# Patient Record
Sex: FEMALE | Race: WHITE | NOT HISPANIC OR LATINO | Employment: OTHER | URBAN - METROPOLITAN AREA
[De-identification: names, ages, dates, MRNs, and addresses within clinical notes are randomized per-mention and may not be internally consistent; named-entity substitution may affect disease eponyms.]

---

## 2018-03-22 ENCOUNTER — NEW PATIENT COMPREHENSIVE (OUTPATIENT)
Dept: URBAN - METROPOLITAN AREA CLINIC 46 | Facility: CLINIC | Age: 73
End: 2018-03-22

## 2018-03-22 DIAGNOSIS — H25.813: ICD-10-CM

## 2018-03-22 DIAGNOSIS — H04.123: ICD-10-CM

## 2018-03-22 DIAGNOSIS — H53.8: ICD-10-CM

## 2018-03-22 PROCEDURE — G8482 FLU IMMUNIZE ORDER/ADMIN: HCPCS

## 2018-03-22 PROCEDURE — 92015 DETERMINE REFRACTIVE STATE: CPT

## 2018-03-22 PROCEDURE — 4040F PNEUMOC VAC/ADMIN/RCVD: CPT

## 2018-03-22 PROCEDURE — 1036F TOBACCO NON-USER: CPT

## 2018-03-22 PROCEDURE — 92004 COMPRE OPH EXAM NEW PT 1/>: CPT

## 2018-03-22 PROCEDURE — G8785 BP SCRN NO PERF AT INTERVAL: HCPCS

## 2018-03-22 PROCEDURE — G8427 DOCREV CUR MEDS BY ELIG CLIN: HCPCS

## 2018-03-22 ASSESSMENT — VISUAL ACUITY
OS_CC: 20/25-1
OS_SC: 20/50
OD_CC: 20/25-1
OS_CC: J1
OS_SC: J6
OD_CC: J1
OD_SC: 20/40
OD_SC: J12

## 2018-03-22 ASSESSMENT — TONOMETRY
OS_IOP_MMHG: 17
OD_IOP_MMHG: 16

## 2018-11-12 ENCOUNTER — ESTABLISHED PATIENT (OUTPATIENT)
Dept: URBAN - METROPOLITAN AREA CLINIC 46 | Facility: CLINIC | Age: 73
End: 2018-11-12

## 2018-11-12 DIAGNOSIS — H35.371: ICD-10-CM

## 2018-11-12 DIAGNOSIS — H25.813: ICD-10-CM

## 2018-11-12 DIAGNOSIS — H04.123: ICD-10-CM

## 2018-11-12 DIAGNOSIS — H43.813: ICD-10-CM

## 2018-11-12 PROCEDURE — 99214 OFFICE O/P EST MOD 30 MIN: CPT

## 2018-11-12 ASSESSMENT — TONOMETRY
OD_IOP_MMHG: 15
OS_IOP_MMHG: 16

## 2018-11-12 ASSESSMENT — VISUAL ACUITY
OD_CC: J5
OD_CC: 20/50
OS_CC: 20/40+2
OS_CC: J5

## 2018-11-27 ENCOUNTER — CATARACT CONSULT (OUTPATIENT)
Dept: URBAN - METROPOLITAN AREA CLINIC 43 | Facility: CLINIC | Age: 73
End: 2018-11-27

## 2018-11-27 VITALS
SYSTOLIC BLOOD PRESSURE: 126 MMHG | RESPIRATION RATE: 14 BRPM | DIASTOLIC BLOOD PRESSURE: 58 MMHG | HEIGHT: 55 IN | HEART RATE: 56 BPM

## 2018-11-27 DIAGNOSIS — H43.813: ICD-10-CM

## 2018-11-27 DIAGNOSIS — H35.371: ICD-10-CM

## 2018-11-27 DIAGNOSIS — H04.123: ICD-10-CM

## 2018-11-27 DIAGNOSIS — H25.811: ICD-10-CM

## 2018-11-27 DIAGNOSIS — H18.51: ICD-10-CM

## 2018-11-27 DIAGNOSIS — H25.812: ICD-10-CM

## 2018-11-27 PROCEDURE — 92025-2 CORNEAL TOPOGRAPHY, PT

## 2018-11-27 PROCEDURE — 92286 ANT SGM IMG I&R SPECLR MIC: CPT

## 2018-11-27 PROCEDURE — G9903 PT SCRN TBCO ID AS NON USER: HCPCS

## 2018-11-27 PROCEDURE — 92136TC INTERFEROMETRY - TECHNICAL COMPONENT

## 2018-11-27 PROCEDURE — G8482 FLU IMMUNIZE ORDER/ADMIN: HCPCS

## 2018-11-27 PROCEDURE — 92134 CPTRZ OPH DX IMG PST SGM RTA: CPT

## 2018-11-27 PROCEDURE — G8952 PRE-HTN/HTN, NO F/U, NOT GVN: HCPCS

## 2018-11-27 PROCEDURE — 1036F TOBACCO NON-USER: CPT

## 2018-11-27 PROCEDURE — 99214 OFFICE O/P EST MOD 30 MIN: CPT

## 2018-11-27 PROCEDURE — V2799I IMPRIMIS

## 2018-11-27 PROCEDURE — 4040F PNEUMOC VAC/ADMIN/RCVD: CPT

## 2018-11-27 PROCEDURE — G8428 CUR MEDS NOT DOCUMENT: HCPCS

## 2018-11-27 RX ORDER — NEPAFENAC 3 MG/ML: 1 SUSPENSION/ DROPS OPHTHALMIC ONCE A DAY

## 2018-11-27 ASSESSMENT — TONOMETRY
OD_IOP_MMHG: 14
OS_IOP_MMHG: 14

## 2018-11-27 ASSESSMENT — VISUAL ACUITY
OS_BAT: 20/60
OD_PAM: 20/20-1
OS_SC: J6
OD_BAT: 20/80
OS_SC: 20/50-1
OD_SC: 20/60-1
OS_CC: 20/25
OD_SC: J12
OD_CC: J1
OS_CC: J2
OS_AM: 20/20-1
OD_CC: 20/40-1

## 2019-01-09 ENCOUNTER — PRE-OP/H&P (OUTPATIENT)
Dept: URBAN - METROPOLITAN AREA CLINIC 39 | Facility: CLINIC | Age: 74
End: 2019-01-09

## 2019-01-09 ENCOUNTER — SURGERY/PROCEDURE (OUTPATIENT)
Dept: URBAN - METROPOLITAN AREA CLINIC 43 | Facility: CLINIC | Age: 74
End: 2019-01-09

## 2019-01-09 VITALS
RESPIRATION RATE: 14 BRPM | HEART RATE: 58 BPM | SYSTOLIC BLOOD PRESSURE: 126 MMHG | DIASTOLIC BLOOD PRESSURE: 56 MMHG | HEIGHT: 55 IN

## 2019-01-09 DIAGNOSIS — H25.812: ICD-10-CM

## 2019-01-09 PROCEDURE — 66999LNSR LENSAR LASER FOR CAT SX

## 2019-01-09 PROCEDURE — 4040F PNEUMOC VAC/ADMIN/RCVD: CPT

## 2019-01-09 PROCEDURE — G8952 PRE-HTN/HTN, NO F/U, NOT GVN: HCPCS

## 2019-01-09 PROCEDURE — 99211T TECH SERVICE

## 2019-01-09 PROCEDURE — 66984AV REMOVE CATARACT, INSERT ADVANCED LENS

## 2019-01-09 PROCEDURE — 65772LRI LRI DURING CAT SX

## 2019-01-09 PROCEDURE — 1036F TOBACCO NON-USER: CPT

## 2019-01-09 PROCEDURE — G8418 CALC BMI BLW LOW PARAM F/U: HCPCS

## 2019-01-09 PROCEDURE — G8427 DOCREV CUR MEDS BY ELIG CLIN: HCPCS

## 2019-01-09 PROCEDURE — G8482 FLU IMMUNIZE ORDER/ADMIN: HCPCS

## 2019-01-09 PROCEDURE — G9903 PT SCRN TBCO ID AS NON USER: HCPCS

## 2019-01-09 RX ORDER — NEPAFENAC 3 MG/ML: 1 SUSPENSION/ DROPS OPHTHALMIC ONCE A DAY

## 2019-01-10 ENCOUNTER — POST OP/EVAL OF SECOND EYE (OUTPATIENT)
Dept: URBAN - METROPOLITAN AREA CLINIC 46 | Facility: CLINIC | Age: 74
End: 2019-01-10

## 2019-01-10 DIAGNOSIS — H25.811: ICD-10-CM

## 2019-01-10 DIAGNOSIS — Z96.1: ICD-10-CM

## 2019-01-10 PROCEDURE — 99213 OFFICE O/P EST LOW 20 MIN: CPT

## 2019-01-10 PROCEDURE — 99024 POSTOP FOLLOW-UP VISIT: CPT

## 2019-01-10 PROCEDURE — 1036F TOBACCO NON-USER: CPT

## 2019-01-10 PROCEDURE — G8427 DOCREV CUR MEDS BY ELIG CLIN: HCPCS

## 2019-01-10 PROCEDURE — G8482 FLU IMMUNIZE ORDER/ADMIN: HCPCS

## 2019-01-10 PROCEDURE — 4040F PNEUMOC VAC/ADMIN/RCVD: CPT

## 2019-01-10 PROCEDURE — G9903 PT SCRN TBCO ID AS NON USER: HCPCS

## 2019-01-10 ASSESSMENT — VISUAL ACUITY
OD_SC: 20/50
OD_SC: J10
OS_SC: J6
OS_SC: 20/30

## 2019-01-10 ASSESSMENT — TONOMETRY
OD_IOP_MMHG: 14
OS_IOP_MMHG: 18

## 2019-01-16 ENCOUNTER — SURGERY/PROCEDURE (OUTPATIENT)
Dept: URBAN - METROPOLITAN AREA CLINIC 43 | Facility: CLINIC | Age: 74
End: 2019-01-16

## 2019-01-16 ENCOUNTER — PRE-OP/H&P (OUTPATIENT)
Dept: URBAN - METROPOLITAN AREA CLINIC 39 | Facility: CLINIC | Age: 74
End: 2019-01-16

## 2019-01-16 DIAGNOSIS — Z96.1: ICD-10-CM

## 2019-01-16 DIAGNOSIS — H18.51: ICD-10-CM

## 2019-01-16 DIAGNOSIS — H25.811: ICD-10-CM

## 2019-01-16 DIAGNOSIS — H35.371: ICD-10-CM

## 2019-01-16 PROCEDURE — 66984CV REMOVE CATARACT, INSERT LENS, CUSTOM VISION

## 2019-01-16 PROCEDURE — G9903 PT SCRN TBCO ID AS NON USER: HCPCS

## 2019-01-16 PROCEDURE — 1036F TOBACCO NON-USER: CPT

## 2019-01-16 PROCEDURE — G8785 BP SCRN NO PERF AT INTERVAL: HCPCS

## 2019-01-16 PROCEDURE — 4040F PNEUMOC VAC/ADMIN/RCVD: CPT

## 2019-01-16 PROCEDURE — 66999LNSR LENSAR LASER FOR CAT SX

## 2019-01-16 PROCEDURE — 99211T TECH SERVICE

## 2019-01-16 PROCEDURE — 65772LRI LRI DURING CAT SX

## 2019-01-16 PROCEDURE — G8428 CUR MEDS NOT DOCUMENT: HCPCS

## 2019-01-16 PROCEDURE — G8482 FLU IMMUNIZE ORDER/ADMIN: HCPCS

## 2019-01-16 ASSESSMENT — TONOMETRY
OD_IOP_MMHG: 16
OS_IOP_MMHG: 18

## 2019-01-16 ASSESSMENT — VISUAL ACUITY
OS_SC: J2
OS_SC: 20/25
OD_SC: 20/50-1
OD_SC: J10

## 2019-01-17 ENCOUNTER — CATARACT POST-OP 1-DAY (OUTPATIENT)
Dept: URBAN - METROPOLITAN AREA CLINIC 46 | Facility: CLINIC | Age: 74
End: 2019-01-17

## 2019-01-17 DIAGNOSIS — Z96.1: ICD-10-CM

## 2019-01-17 PROCEDURE — 99024 POSTOP FOLLOW-UP VISIT: CPT

## 2019-01-17 ASSESSMENT — TONOMETRY
OS_IOP_MMHG: 17
OD_IOP_MMHG: 15

## 2019-01-17 ASSESSMENT — VISUAL ACUITY
OS_SC: 20/20
OS_SC: J3

## 2019-01-28 ENCOUNTER — POST-OP CATARACT (OUTPATIENT)
Dept: URBAN - METROPOLITAN AREA CLINIC 46 | Facility: CLINIC | Age: 74
End: 2019-01-28

## 2019-01-28 DIAGNOSIS — Z96.1: ICD-10-CM

## 2019-01-28 PROCEDURE — 99024 POSTOP FOLLOW-UP VISIT: CPT

## 2019-01-28 ASSESSMENT — VISUAL ACUITY
OS_SC: 20/20
OS_SC: J3
OD_SC: J10
OD_SC: 20/20

## 2019-01-28 ASSESSMENT — TONOMETRY
OD_IOP_MMHG: 14
OS_IOP_MMHG: 14

## 2019-03-07 ENCOUNTER — POST-OP CATARACT (OUTPATIENT)
Dept: URBAN - METROPOLITAN AREA CLINIC 46 | Facility: CLINIC | Age: 74
End: 2019-03-07

## 2019-03-07 DIAGNOSIS — H26.493: ICD-10-CM

## 2019-03-07 DIAGNOSIS — H35.341: ICD-10-CM

## 2019-03-07 PROCEDURE — 99213 OFFICE O/P EST LOW 20 MIN: CPT

## 2019-03-07 ASSESSMENT — VISUAL ACUITY
OD_SC: 20/30-2
OS_SC: 20/30

## 2019-03-07 ASSESSMENT — TONOMETRY
OD_IOP_MMHG: 15
OS_IOP_MMHG: 14

## 2019-03-19 ENCOUNTER — RETINA CONSULT (OUTPATIENT)
Dept: URBAN - METROPOLITAN AREA CLINIC 46 | Facility: CLINIC | Age: 74
End: 2019-03-19

## 2019-03-19 DIAGNOSIS — H35.371: ICD-10-CM

## 2019-03-19 DIAGNOSIS — H35.351: ICD-10-CM

## 2019-03-19 DIAGNOSIS — H43.393: ICD-10-CM

## 2019-03-19 PROCEDURE — 67515 INJECT/TREAT EYE SOCKET: CPT

## 2019-03-19 PROCEDURE — 92250 FUNDUS PHOTOGRAPHY W/I&R: CPT

## 2019-03-19 PROCEDURE — 92134 CPTRZ OPH DX IMG PST SGM RTA: CPT

## 2019-03-19 PROCEDURE — 92014 COMPRE OPH EXAM EST PT 1/>: CPT

## 2019-03-19 PROCEDURE — 92235 FLUORESCEIN ANGRPH MLTIFRAME: CPT

## 2019-03-19 PROCEDURE — 9222550 BILAT EXTENDED OPHTHALMOSCOPY, FIRST

## 2019-03-19 RX ORDER — SUMATRIPTAN SUCCINATE 50 MG/1
1 TABLET ORAL
Start: 2019-03-19

## 2019-03-19 RX ORDER — KETOROLAC TROMETHAMINE 5 MG/ML
1 SOLUTION OPHTHALMIC
Start: 2019-03-19

## 2019-03-19 RX ORDER — PREDNISOLONE ACETATE 10 MG/ML
1 SUSPENSION/ DROPS OPHTHALMIC
Start: 2019-03-19

## 2019-03-19 ASSESSMENT — TONOMETRY
OD_IOP_MMHG: 12
OS_IOP_MMHG: 13

## 2019-03-19 ASSESSMENT — VISUAL ACUITY: OS_SC: 20/20-1

## 2019-03-25 ENCOUNTER — CONSULT (OUTPATIENT)
Dept: URBAN - METROPOLITAN AREA CLINIC 39 | Facility: CLINIC | Age: 74
End: 2019-03-25

## 2019-03-25 ENCOUNTER — SURGERY/PROCEDURE (OUTPATIENT)
Dept: URBAN - METROPOLITAN AREA SURGERY 14 | Facility: SURGERY | Age: 74
End: 2019-03-25

## 2019-03-25 VITALS
RESPIRATION RATE: 14 BRPM | SYSTOLIC BLOOD PRESSURE: 126 MMHG | HEIGHT: 55 IN | DIASTOLIC BLOOD PRESSURE: 56 MMHG | HEART RATE: 58 BPM

## 2019-03-25 DIAGNOSIS — H26.493: ICD-10-CM

## 2019-03-25 PROCEDURE — 92014 COMPRE OPH EXAM EST PT 1/>: CPT

## 2019-03-25 PROCEDURE — 6682150 YAG CAPSULOTOMY

## 2019-03-25 ASSESSMENT — TONOMETRY
OD_IOP_MMHG: 14
OS_IOP_MMHG: 14

## 2019-03-25 ASSESSMENT — VISUAL ACUITY
OD_SC: 20/30
OS_SC: 20/30
OS_BAT: 20/50
OD_BAT: 20/50

## 2019-04-01 ENCOUNTER — YAG POST-OP (OUTPATIENT)
Dept: URBAN - METROPOLITAN AREA CLINIC 46 | Facility: CLINIC | Age: 74
End: 2019-04-01

## 2019-04-01 DIAGNOSIS — Z98.890: ICD-10-CM

## 2019-04-01 DIAGNOSIS — Z96.1: ICD-10-CM

## 2019-04-01 PROCEDURE — 99024 POSTOP FOLLOW-UP VISIT: CPT

## 2019-04-01 ASSESSMENT — VISUAL ACUITY
OD_SC: 20/40+2
OU_SC: J3
OD_SC: J8
OS_SC: 20/20+2
OU_SC: 20/20+2
OS_SC: J3

## 2019-04-01 ASSESSMENT — TONOMETRY
OS_IOP_MMHG: 17
OD_IOP_MMHG: 19

## 2019-04-24 ENCOUNTER — FOLLOW UP (OUTPATIENT)
Dept: URBAN - METROPOLITAN AREA CLINIC 39 | Facility: CLINIC | Age: 74
End: 2019-04-24

## 2019-04-24 DIAGNOSIS — H35.371: ICD-10-CM

## 2019-04-24 DIAGNOSIS — H35.351: ICD-10-CM

## 2019-04-24 DIAGNOSIS — H43.813: ICD-10-CM

## 2019-04-24 DIAGNOSIS — H35.372: ICD-10-CM

## 2019-04-24 PROCEDURE — 92134 CPTRZ OPH DX IMG PST SGM RTA: CPT

## 2019-04-24 PROCEDURE — 9222650 BILAT EXTENDED OPHTHALMOSCOPY, F/U

## 2019-04-24 PROCEDURE — 92012 INTRM OPH EXAM EST PATIENT: CPT

## 2019-04-24 ASSESSMENT — VISUAL ACUITY
OD_SC: 20/50
OS_SC: 20/30

## 2019-04-24 ASSESSMENT — TONOMETRY
OD_IOP_MMHG: 17
OS_IOP_MMHG: 13

## 2019-12-11 ENCOUNTER — ESTABLISHED COMPREHENSIVE EXAM (OUTPATIENT)
Dept: URBAN - METROPOLITAN AREA CLINIC 46 | Facility: CLINIC | Age: 74
End: 2019-12-11

## 2019-12-11 DIAGNOSIS — H26.493: ICD-10-CM

## 2019-12-11 DIAGNOSIS — H43.813: ICD-10-CM

## 2019-12-11 DIAGNOSIS — H04.123: ICD-10-CM

## 2019-12-11 DIAGNOSIS — H52.7: ICD-10-CM

## 2019-12-11 PROCEDURE — 92014 COMPRE OPH EXAM EST PT 1/>: CPT

## 2019-12-11 ASSESSMENT — VISUAL ACUITY
OU_SC: 20/20
OD_SC: 20/25-2
OS_SC: 20/20
OU_SC: J2
OD_SC: J4
OS_SC: J2

## 2019-12-11 ASSESSMENT — TONOMETRY
OS_IOP_MMHG: 14
OD_IOP_MMHG: 14

## 2020-08-13 NOTE — PROCEDURE NOTE: CLINICAL
PROCEDURE NOTE: Removal of Conjunctival FB, Superficial OS. Prior to treatment, the risks/benefits/alternatives were discussed. The patient wished to proceed with procedure. Superficial conjunctival FB removed with forcep/needle. Globe and conjunctiva intact. Patient tolerated procedure well. There were no complications. Post procedure instructions given. Krystle Figueroa

## 2020-08-20 NOTE — PATIENT DISCUSSION
Despite some risk factors, the patient does not demonstrate definitive evidence of glaucoma at this time. Normal IOP. Physiologically large nerves, but healthy in appearance. Family history (father).

## 2021-02-01 ENCOUNTER — ESTABLISHED COMPREHENSIVE EXAM (OUTPATIENT)
Dept: URBAN - METROPOLITAN AREA CLINIC 46 | Facility: CLINIC | Age: 76
End: 2021-02-01

## 2021-02-01 DIAGNOSIS — H43.813: ICD-10-CM

## 2021-02-01 DIAGNOSIS — H35.372: ICD-10-CM

## 2021-02-01 DIAGNOSIS — H52.7: ICD-10-CM

## 2021-02-01 DIAGNOSIS — H04.123: ICD-10-CM

## 2021-02-01 DIAGNOSIS — H26.493: ICD-10-CM

## 2021-02-01 DIAGNOSIS — H35.371: ICD-10-CM

## 2021-02-01 PROCEDURE — 92134 CPTRZ OPH DX IMG PST SGM RTA: CPT

## 2021-02-01 PROCEDURE — 92014 COMPRE OPH EXAM EST PT 1/>: CPT

## 2021-02-01 ASSESSMENT — VISUAL ACUITY
OS_SC: J1
OS_SC: 20/20
OD_SC: 20/25

## 2021-02-01 ASSESSMENT — TONOMETRY
OD_IOP_MMHG: 15
OS_IOP_MMHG: 15

## 2022-03-09 ENCOUNTER — COMPREHENSIVE EXAM (OUTPATIENT)
Dept: URBAN - METROPOLITAN AREA CLINIC 46 | Facility: CLINIC | Age: 77
End: 2022-03-09

## 2022-03-09 DIAGNOSIS — H04.123: ICD-10-CM

## 2022-03-09 DIAGNOSIS — H35.372: ICD-10-CM

## 2022-03-09 DIAGNOSIS — H35.371: ICD-10-CM

## 2022-03-09 DIAGNOSIS — H52.7: ICD-10-CM

## 2022-03-09 DIAGNOSIS — H26.493: ICD-10-CM

## 2022-03-09 DIAGNOSIS — H43.813: ICD-10-CM

## 2022-03-09 PROCEDURE — 92134 CPTRZ OPH DX IMG PST SGM RTA: CPT

## 2022-03-09 PROCEDURE — 92014 COMPRE OPH EXAM EST PT 1/>: CPT

## 2022-03-09 ASSESSMENT — VISUAL ACUITY
OD_SC: J6
OD_CC: J1+
OU_SC: 20/20+2
OS_SC: J2
OS_CC: J1+
OU_SC: J1
OD_SC: 20/25 BLURRY
OS_SC: 20/20

## 2022-03-09 ASSESSMENT — TONOMETRY
OS_IOP_MMHG: 13
OD_IOP_MMHG: 14

## 2024-02-15 ENCOUNTER — FOLLOW UP (OUTPATIENT)
Dept: URBAN - METROPOLITAN AREA CLINIC 46 | Facility: CLINIC | Age: 79
End: 2024-02-15

## 2024-02-15 DIAGNOSIS — H43.813: ICD-10-CM

## 2024-02-15 DIAGNOSIS — H35.371: ICD-10-CM

## 2024-02-15 DIAGNOSIS — H04.123: ICD-10-CM

## 2024-02-15 DIAGNOSIS — Z98.890: ICD-10-CM

## 2024-02-15 DIAGNOSIS — Z96.1: ICD-10-CM

## 2024-02-15 PROCEDURE — 92134 CPTRZ OPH DX IMG PST SGM RTA: CPT

## 2024-02-15 PROCEDURE — 92012 INTRM OPH EXAM EST PATIENT: CPT

## 2024-02-15 ASSESSMENT — TONOMETRY
OS_IOP_MMHG: 11
OD_IOP_MMHG: 11

## 2024-02-15 ASSESSMENT — VISUAL ACUITY
OD_SC: 20/20
OU_SC: 20/15
OS_SC: 20/15
OS_SC: J2
OU_CC: J1+
OU_SC: J1
OS_CC: J1+
OD_SC: J6
OD_CC: J1

## 2025-02-20 ENCOUNTER — COMPREHENSIVE EXAM (OUTPATIENT)
Age: 80
End: 2025-02-20

## 2025-02-20 DIAGNOSIS — H35.373: ICD-10-CM

## 2025-02-20 DIAGNOSIS — H52.7: ICD-10-CM

## 2025-02-20 DIAGNOSIS — Z98.890: ICD-10-CM

## 2025-02-20 DIAGNOSIS — H43.813: ICD-10-CM

## 2025-02-20 DIAGNOSIS — Z96.1: ICD-10-CM

## 2025-02-20 DIAGNOSIS — H04.123: ICD-10-CM

## 2025-02-20 PROCEDURE — 92014 COMPRE OPH EXAM EST PT 1/>: CPT

## 2025-02-20 PROCEDURE — 92134 CPTRZ OPH DX IMG PST SGM RTA: CPT
